# Patient Record
Sex: MALE | Race: WHITE | NOT HISPANIC OR LATINO | URBAN - METROPOLITAN AREA
[De-identification: names, ages, dates, MRNs, and addresses within clinical notes are randomized per-mention and may not be internally consistent; named-entity substitution may affect disease eponyms.]

---

## 2017-08-18 ENCOUNTER — EMERGENCY (EMERGENCY)
Facility: HOSPITAL | Age: 37
LOS: 1 days | Discharge: HOME | End: 2017-08-18
Admitting: EMERGENCY MEDICINE
Payer: SELF-PAY

## 2017-08-18 VITALS
HEART RATE: 46 BPM | RESPIRATION RATE: 19 BRPM | OXYGEN SATURATION: 99 % | TEMPERATURE: 98 F | SYSTOLIC BLOOD PRESSURE: 124 MMHG | DIASTOLIC BLOOD PRESSURE: 84 MMHG

## 2017-08-18 DIAGNOSIS — H57.10 OCULAR PAIN, UNSPECIFIED EYE: ICD-10-CM

## 2017-08-18 DIAGNOSIS — R21 RASH AND OTHER NONSPECIFIC SKIN ERUPTION: ICD-10-CM

## 2017-08-18 DIAGNOSIS — Z88.0 ALLERGY STATUS TO PENICILLIN: ICD-10-CM

## 2017-08-18 PROCEDURE — 99283 EMERGENCY DEPT VISIT LOW MDM: CPT

## 2017-08-18 NOTE — ED PROVIDER NOTE - OBJECTIVE STATEMENT
37 y/o M       no PMH    Pt presents with c/o redness underneath both eyes that is itchy and "puffy" x 2 days. Zavala snot wear contacts. Denies pain, photophobia, discharge/crusting, visual changes, new use of facial product

## 2017-08-18 NOTE — ED PROVIDER NOTE - PHYSICAL EXAMINATION
Bilateral eyes: skin inferior to lower eyelid is erythematous, nonblanching, scaly, blotchy appearance/wheels  no change to the eyelids or sclera/conjunctiva- normal appearing  (-) warmth, discharge  PERRLA, 20/20 vision to both eyes, EOMs normal

## 2017-08-18 NOTE — ED PROVIDER NOTE - MEDICAL DECISION MAKING DETAILS
likely a contact dermatitis or allergy related rash. Will  to use Eucerin cream and hydrocortisone (keep away from eye as much as possible). follow up with PMD

## 2020-02-20 NOTE — ED ADULT NURSE NOTE - CHIEF COMPLAINT
The patient is a 36y Male complaining of eye pain/injury. (1) other risk factor (includes escalating BMI, pack-years of smoking, diabetes requiring insulin, chemotherapy, female gender and length of surgery)

## 2021-11-21 NOTE — ED PROVIDER NOTE - NS ED ACP STMENT
“I have personally evaluated and examined the patient. The Attending was available to me as a supervising provider if needed.”
No

## 2025-04-09 NOTE — ED ADULT NURSE NOTE - DISCHARGE DATE/TIME
18-Aug-2017 09:01 You can access the FollowMyHealth Patient Portal offered by [facility] by registering at the following website: [email or web address]. By joining [employer]’s FollowMyHealth portal, you will also be able to view your health information using other applications (apps) compatible with our system.